# Patient Record
Sex: MALE | Race: WHITE | NOT HISPANIC OR LATINO | Employment: FULL TIME | URBAN - METROPOLITAN AREA
[De-identification: names, ages, dates, MRNs, and addresses within clinical notes are randomized per-mention and may not be internally consistent; named-entity substitution may affect disease eponyms.]

---

## 2023-09-22 PROBLEM — M24.273: Status: ACTIVE | Noted: 2023-09-22

## 2023-09-22 PROBLEM — J34.3 HYPERTROPHY OF BOTH INFERIOR NASAL TURBINATES: Status: ACTIVE | Noted: 2023-09-22

## 2023-09-22 PROBLEM — R10.9 ABDOMINAL PAIN: Status: ACTIVE | Noted: 2023-09-22

## 2023-09-22 PROBLEM — K29.00 ACUTE GASTRITIS: Status: ACTIVE | Noted: 2023-09-22

## 2023-09-22 PROBLEM — L29.3 PENILE PRURITUS: Status: ACTIVE | Noted: 2023-09-22

## 2023-09-22 PROBLEM — R14.0 ABDOMINAL BLOATING: Status: ACTIVE | Noted: 2023-09-22

## 2023-09-22 PROBLEM — M25.562 BILATERAL KNEE PAIN: Status: ACTIVE | Noted: 2023-09-22

## 2023-09-22 PROBLEM — R22.0 NASAL SWELLING: Status: ACTIVE | Noted: 2023-09-22

## 2023-09-22 PROBLEM — M22.41 CHONDROMALACIA OF RIGHT PATELLA: Status: ACTIVE | Noted: 2023-09-22

## 2023-09-22 PROBLEM — S09.90XA HEAD INJURY: Status: ACTIVE | Noted: 2023-09-22

## 2023-09-22 PROBLEM — M70.40 PREPATELLAR BURSITIS: Status: ACTIVE | Noted: 2023-09-22

## 2023-09-22 PROBLEM — Z20.2 EXPOSURE TO STD: Status: ACTIVE | Noted: 2023-09-22

## 2023-09-22 PROBLEM — M25.561 BILATERAL KNEE PAIN: Status: ACTIVE | Noted: 2023-09-22

## 2023-09-22 PROBLEM — J34.2 DEVIATED NASAL SEPTUM: Status: ACTIVE | Noted: 2023-09-22

## 2023-09-22 PROBLEM — N34.2 URETHRITIS: Status: ACTIVE | Noted: 2023-09-22

## 2023-09-22 PROBLEM — M76.52 PATELLAR TENDINITIS OF LEFT KNEE: Status: ACTIVE | Noted: 2023-09-22

## 2023-09-22 RX ORDER — FLUTICASONE PROPIONATE 50 MCG
2 SPRAY, SUSPENSION (ML) NASAL DAILY
COMMUNITY
End: 2023-10-23 | Stop reason: ALTCHOICE

## 2023-10-23 ENCOUNTER — OFFICE VISIT (OUTPATIENT)
Dept: OTOLARYNGOLOGY | Facility: CLINIC | Age: 28
End: 2023-10-23
Payer: COMMERCIAL

## 2023-10-23 VITALS — WEIGHT: 225 LBS | BODY MASS INDEX: 26.57 KG/M2 | HEIGHT: 77 IN

## 2023-10-23 DIAGNOSIS — J34.3 HYPERTROPHY OF BOTH INFERIOR NASAL TURBINATES: ICD-10-CM

## 2023-10-23 DIAGNOSIS — J34.2 DEVIATED NASAL SEPTUM: Primary | ICD-10-CM

## 2023-10-23 PROCEDURE — 1036F TOBACCO NON-USER: CPT | Performed by: OTOLARYNGOLOGY

## 2023-10-23 PROCEDURE — 99214 OFFICE O/P EST MOD 30 MIN: CPT | Performed by: OTOLARYNGOLOGY

## 2023-10-23 NOTE — PROGRESS NOTES
"Chief Complaint   Patient presents with    NASAL CHECK     EP(2/13/23)- DNS, DISCUSS OPTIONS     HPI:  Corona Markham is a 28 y.o. male who presents in follow-up today for his chronic problems with nasal congestion and stuffy nose.  Can be on both sides.  Previously seen earlier this year was found to have severe left-sided nasal septal deviation with some turbinate hypertrophy.  No help with fluticasone.    PMH:  History reviewed. No pertinent past medical history.  History reviewed. No pertinent surgical history.      Medications:   No current outpatient medications on file.     Allergies:  No Known Allergies     ROS:  Review of systems normal unless stated otherwise in the HPI and/or PMH.    Physical Exam:  Height 1.956 m (6' 5\"), weight 102 kg (225 lb). Body mass index is 26.68 kg/m².     GENERAL APPEARANCE: Well developed and well nourished.  Alert and oriented in no acute distress.  Normal vocal quality.      HEAD/FACE: No erythema or edema or facial tenderness.  Normal facial nerve function bilaterally.    EAR:       EXTERNAL: Normal pinnas and external auditory canals without lesion or obstructing wax.       MIDDLE EAR: Tympanic membranes intact and mobile with normal landmarks.  Middle ear space appears well aerated.       TUBE STATUS: N/A       MASTOID CAVITY: N/A       HEARING: Gross hearing assessment is within normal limits.      NOSE:       VISUALIZED USING: Anterior rhinoscopy with headlight and nasal speculum.       DORSUM: Midline, nontraumatic appearance.       MUCOSA: Normal-appearing.       SECRETIONS: Normal.       SEPTUM: Severe left deviation touching the inferior turbinate       INFERIOR TURBINATES: Bilateral hypertrophy       MIDDLE TURBINATES/MEATUS: N/A       BLEEDING: N/A         ORAL CAVITY/PHARYNX:       TEETH: Adequate dentition.       TONGUE: No mass or lesion.  Normal mobility.       FLOOR OF MOUTH: No mass or lesion.       PALATE: Normal hard palate, soft palate, and uvula.       " OROPHARYNX: Normal without mass or lesion.       BUCCAL MUCOSA/GBS: Normal without mass or lesion.       LIPS: Normal.    LARYNX/HYPOPHARYNX/NASOPHARYNX: N/A    NECK: No palpable masses or abnormal adenopathy.  Trachea is midline.    THYROID: No thyromegaly or palpable nodule.    SALIVARY GLANDS: Normal bilateral parotid and submandibular glands by inspection and palpation.    TMJ's: Normal.    NEURO: Cranial nerve exam grossly normal bilaterally.       Assessment/Plan   Corona was seen today for nasal check.  Diagnoses and all orders for this visit:  Deviated nasal septum (Primary)  Hypertrophy of both inferior nasal turbinates     Discussed with him the option of surgery including septoplasty and bilateral inferior turbinate submucous resection and outfracture.  We discussed the surgical procedure, postoperative care, the risk such as but not limited to bleeding, infection, and failure to help as much as he would like.  He is aware that secondary to some of the cartilaginous deviation anteriorly that we may not be able to get things 100% but I think it will significantly help him.  He is aware that he does have the option of seeing plastic surgery but does not wish to do that.  He would like to proceed with me.  Follow up for evaluation after surgery.     Caleb Tipton MD

## 2024-01-22 ENCOUNTER — APPOINTMENT (OUTPATIENT)
Dept: PRIMARY CARE | Facility: CLINIC | Age: 29
End: 2024-01-22
Payer: COMMERCIAL

## 2024-01-30 ENCOUNTER — APPOINTMENT (OUTPATIENT)
Dept: PRIMARY CARE | Facility: CLINIC | Age: 29
End: 2024-01-30
Payer: COMMERCIAL